# Patient Record
Sex: MALE
[De-identification: names, ages, dates, MRNs, and addresses within clinical notes are randomized per-mention and may not be internally consistent; named-entity substitution may affect disease eponyms.]

---

## 2018-05-01 ENCOUNTER — HOSPITAL ENCOUNTER (EMERGENCY)
Dept: HOSPITAL 14 - H.ER | Age: 28
Discharge: HOME | End: 2018-05-01
Payer: COMMERCIAL

## 2018-05-01 VITALS
RESPIRATION RATE: 18 BRPM | SYSTOLIC BLOOD PRESSURE: 127 MMHG | HEART RATE: 84 BPM | OXYGEN SATURATION: 99 % | DIASTOLIC BLOOD PRESSURE: 79 MMHG | TEMPERATURE: 98.2 F
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
## 2018-05-01 DIAGNOSIS — S86.819A: Primary | ICD-10-CM
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
## 2018-05-01 NOTE — ED PDOC
Lower Extremity Pain/Injury


Time Seen by Provider: 05/01/18 12:29


Chief Complaint (Nursing): Lower Extremity Problem/Injury


Chief Complaint (Provider): Lower Extremity Problem/Injury


History Per: Patient


History/Exam Limitations: no limitations


Onset/Duration Of Symptoms: Days (x1)


Current Symptoms Are (Timing): Still Present


Additional Complaint(s): 


Alec Ko is a 28 year old male with no past medical history, who is 

presenting to the ER for evaluation of right calf pain, onset last night s/p 

playing softball. Patient states that he felt a pull while jumping for a ball 

and states that he is unable to bear weight on the leg. Pt has appointment with 

orthopedics for monday ut states it is difficult for him to walk around. He 

denies any weakness, numbness, tingling, chest pain, shortness of breath, or 

cough. Patient offers no other medical complaints at this time.





PMD: Doctor, Leslie











Past Medical History


Reviewed: Historical Data, Nursing Documentation, Vital Signs


Vital Signs: 





 Last Vital Signs











Temp  98.2 F   05/01/18 12:24


 


Pulse  84   05/01/18 12:24


 


Resp  18   05/01/18 12:24


 


BP  127/79   05/01/18 12:24


 


Pulse Ox  99   05/01/18 12:24














- Medical History


PMH: No Chronic Diseases





- Surgical History


Surgical History: No Surg Hx





- Family History


Family History: States: Unknown Family Hx





- Living Arrangements


Living Arrangements: Other





- Social History


Current smoker - smoking cessation education provided: No


Alcohol: Occasional


Drugs: Denies





- Immunization History


Hx Tetanus Toxoid Vaccination: No





- Allergies


Allergies/Adverse Reactions: 


 Allergies











Allergy/AdvReac Type Severity Reaction Status Date / Time


 


No Known Allergies Allergy   Verified 05/01/18 12:23














Review of Systems


ROS Statement: Except As Marked, All Systems Reviewed And Found Negative


Cardiovascular: Negative for: Chest Pain


Respiratory: Negative for: Cough, Shortness of Breath


Musculoskeletal: Positive for: Leg Pain (right calf)


Neurological: Negative for: Weakness, Numbness, Other (tingling)





Physical Exam





- Reviewed


Nursing Documentation Reviewed: Yes


Vital Signs Reviewed: Yes





- Physical Exam


Appears: Positive for: Well, Non-toxic, No Acute Distress


Head Exam: Positive for: ATRAUMATIC


Skin: Positive for: Normal Color


Eye Exam: Positive for: Normal appearance


ENT: Positive for: Normal ENT Inspection


Neck: Positive for: Normal


Respiratory: Negative for: Accessory Muscle Use, Respiratory Distress


Pulses-Dorsalis Pedis (L): 2+


Pulses-Dorsalis Pedis (R): 2+


Pulses-Post. Tibialis (L): 2+


Pulses-Post. Tibialis (R): 2+


Extremity: Positive for: Normal ROM (full ROM of ankle, knee and hip), Other (

Negative Boston's test; Achilles palpated bilaterally and is equal and non-

tender).  Negative for: Tenderness (Achilles), Pedal Edema, Calf Tenderness, 

Deformity


Neurologic/Psych: Positive for: Alert, Oriented.  Negative for: Motor/Sensory 

Deficits





- ECG


O2 Sat by Pulse Oximetry: 99 (RA)


Pulse Ox Interpretation: Normal





Medical Decision Making


Medical Decision Making: 


Provider evaluated patient in ED. Patient given crutches and an ACE wrap for 

comfort 





Upon provider reevaluation patient is medically stable, and requires no further 

treatment in the ED at this time. Patient will be discharged home. Counseling 

was provided and all questions were answered regarding diagnosis and need for 

follow up with Orthopedics. There is agreement to discharge plan. Return if 

symptoms persist or worsen.


 


--------------------------------------------------------------------------------

---------------------------------------


Scribe Attestation:





Documented by Bozena Conn, acting as a scribe for Asia Colbert PA-C.





Provider Scribe Attestation:





All medical record entries made by the Scribe were at my direction and 

personally dictated by me. I have reviewed the chart and agree that the record 

accurately reflects my personal performance of the history, physical exam, 

medical decision making, and the department course for this patient. I have 

also personally directed, reviewed, and agree with the discharge instructions 

and disposition.











Disposition





- Clinical Impression


Clinical Impression: 


 Muscle strain








- Disposition


Referrals: 


Joshua Bella III, MD [Staff Provider] - 


Disposition Time: 13:16


Condition: STABLE


Instructions:  Muscle Strain


Forms:  [facility] (English)